# Patient Record
Sex: MALE | Race: WHITE | NOT HISPANIC OR LATINO | Employment: FULL TIME | URBAN - NONMETROPOLITAN AREA
[De-identification: names, ages, dates, MRNs, and addresses within clinical notes are randomized per-mention and may not be internally consistent; named-entity substitution may affect disease eponyms.]

---

## 2019-06-20 ENCOUNTER — OFFICE VISIT (OUTPATIENT)
Dept: URGENT CARE | Facility: CLINIC | Age: 34
End: 2019-06-20
Payer: OTHER MISCELLANEOUS

## 2019-06-20 DIAGNOSIS — Z23 NEED FOR TDAP VACCINATION: Primary | ICD-10-CM

## 2019-06-20 DIAGNOSIS — S81.811A LACERATION OF RIGHT LOWER EXTREMITY EXCLUDING THIGH, INITIAL ENCOUNTER: ICD-10-CM

## 2019-06-20 PROCEDURE — 99283 EMERGENCY DEPT VISIT LOW MDM: CPT | Performed by: PHYSICIAN ASSISTANT

## 2019-06-20 PROCEDURE — 12002 RPR S/N/AX/GEN/TRNK2.6-7.5CM: CPT | Performed by: PHYSICIAN ASSISTANT

## 2019-06-20 PROCEDURE — G0382 LEV 3 HOSP TYPE B ED VISIT: HCPCS | Performed by: PHYSICIAN ASSISTANT

## 2019-06-20 RX ORDER — CEPHALEXIN 500 MG/1
500 CAPSULE ORAL EVERY 8 HOURS SCHEDULED
Qty: 15 CAPSULE | Refills: 0 | Status: SHIPPED | OUTPATIENT
Start: 2019-06-20 | End: 2019-06-20 | Stop reason: SDUPTHER

## 2019-06-20 RX ORDER — CEPHALEXIN 500 MG/1
500 CAPSULE ORAL EVERY 8 HOURS SCHEDULED
Qty: 15 CAPSULE | Refills: 0 | Status: SHIPPED | OUTPATIENT
Start: 2019-06-20 | End: 2019-06-25

## 2019-06-28 ENCOUNTER — APPOINTMENT (OUTPATIENT)
Dept: URGENT CARE | Facility: CLINIC | Age: 34
End: 2019-06-28
Payer: OTHER MISCELLANEOUS

## 2019-06-28 PROCEDURE — 99213 OFFICE O/P EST LOW 20 MIN: CPT | Performed by: PHYSICIAN ASSISTANT

## 2021-10-20 ENCOUNTER — HOSPITAL ENCOUNTER (OUTPATIENT)
Dept: ULTRASOUND IMAGING | Facility: HOSPITAL | Age: 36
Discharge: HOME | End: 2021-10-20
Payer: COMMERCIAL

## 2021-10-20 DIAGNOSIS — R22.1 LOCALIZED SWELLING, MASS AND LUMP, NECK: ICD-10-CM

## 2021-10-20 PROCEDURE — 76536 US EXAM OF HEAD AND NECK: CPT

## 2022-01-13 ENCOUNTER — HOSPITAL ENCOUNTER (OUTPATIENT)
Dept: ULTRASOUND IMAGING | Facility: HOSPITAL | Age: 37
Discharge: HOME | End: 2022-01-13
Payer: COMMERCIAL

## 2022-01-13 DIAGNOSIS — R10.2 PERINEAL PAIN IN MALE: ICD-10-CM

## 2022-01-13 PROCEDURE — 76870 US EXAM SCROTUM: CPT

## 2024-02-07 ENCOUNTER — HOSPITAL ENCOUNTER (OUTPATIENT)
Dept: ULTRASOUND IMAGING | Facility: HOSPITAL | Age: 39
Discharge: HOME | End: 2024-02-07
Payer: COMMERCIAL

## 2024-02-07 DIAGNOSIS — R59.1 GENERALIZED ENLARGED LYMPH NODES: ICD-10-CM

## 2024-02-07 PROCEDURE — 76536 US EXAM OF HEAD AND NECK: CPT

## 2025-03-04 ENCOUNTER — TELEPHONE (OUTPATIENT)
Age: 40
End: 2025-03-04

## 2025-03-04 NOTE — TELEPHONE ENCOUNTER
NP calling to schedule appt for urinary hesitancy. Pt requesting an appt today or this week in [place] or [place]. Advised no appts available within timeframe at either location. Pt understood and will call back if he decides to schedule appt.

## 2025-03-09 ENCOUNTER — HOSPITAL ENCOUNTER (EMERGENCY)
Facility: HOSPITAL | Age: 40
Discharge: HOME | End: 2025-03-09
Attending: EMERGENCY MEDICINE | Admitting: EMERGENCY MEDICINE
Payer: COMMERCIAL

## 2025-03-09 ENCOUNTER — APPOINTMENT (EMERGENCY)
Dept: CT IMAGING | Facility: HOSPITAL | Age: 40
End: 2025-03-09
Payer: COMMERCIAL

## 2025-03-09 VITALS
SYSTOLIC BLOOD PRESSURE: 141 MMHG | OXYGEN SATURATION: 99 % | HEART RATE: 98 BPM | RESPIRATION RATE: 18 BRPM | TEMPERATURE: 98.1 F | DIASTOLIC BLOOD PRESSURE: 85 MMHG

## 2025-03-09 DIAGNOSIS — N30.90 CYSTITIS: Primary | ICD-10-CM

## 2025-03-09 LAB
ALBUMIN SERPL BCG-MCNC: 4.8 G/DL (ref 3.5–5)
ALP SERPL-CCNC: 65 U/L (ref 34–104)
ALT SERPL W P-5'-P-CCNC: 37 U/L (ref 7–52)
ANION GAP SERPL CALCULATED.3IONS-SCNC: 6 MMOL/L (ref 4–13)
AST SERPL W P-5'-P-CCNC: 21 U/L (ref 13–39)
BASOPHILS # BLD AUTO: 0.06 THOUSANDS/ÂΜL (ref 0–0.1)
BASOPHILS NFR BLD AUTO: 1 % (ref 0–1)
BILIRUB SERPL-MCNC: 0.56 MG/DL (ref 0.2–1)
BILIRUB UR QL STRIP: NEGATIVE
BUN SERPL-MCNC: 11 MG/DL (ref 5–25)
CALCIUM SERPL-MCNC: 9.5 MG/DL (ref 8.4–10.2)
CHLORIDE SERPL-SCNC: 105 MMOL/L (ref 96–108)
CLARITY UR: CLEAR
CO2 SERPL-SCNC: 30 MMOL/L (ref 21–32)
COLOR UR: YELLOW
CREAT SERPL-MCNC: 0.86 MG/DL (ref 0.6–1.3)
EOSINOPHIL # BLD AUTO: 0.09 THOUSAND/ÂΜL (ref 0–0.61)
EOSINOPHIL NFR BLD AUTO: 1 % (ref 0–6)
ERYTHROCYTE [DISTWIDTH] IN BLOOD BY AUTOMATED COUNT: 12.7 % (ref 11.6–15.1)
GFR SERPL CREATININE-BSD FRML MDRD: 110 ML/MIN/1.73SQ M
GLUCOSE SERPL-MCNC: 86 MG/DL (ref 65–140)
GLUCOSE UR STRIP-MCNC: NEGATIVE MG/DL
HCT VFR BLD AUTO: 47.2 % (ref 36.5–49.3)
HGB BLD-MCNC: 15.5 G/DL (ref 12–17)
HGB UR QL STRIP.AUTO: NEGATIVE
IMM GRANULOCYTES # BLD AUTO: 0.02 THOUSAND/UL (ref 0–0.2)
IMM GRANULOCYTES NFR BLD AUTO: 0 % (ref 0–2)
KETONES UR STRIP-MCNC: NEGATIVE MG/DL
LEUKOCYTE ESTERASE UR QL STRIP: NEGATIVE
LYMPHOCYTES # BLD AUTO: 2.21 THOUSANDS/ÂΜL (ref 0.6–4.47)
LYMPHOCYTES NFR BLD AUTO: 28 % (ref 14–44)
MCH RBC QN AUTO: 28.5 PG (ref 26.8–34.3)
MCHC RBC AUTO-ENTMCNC: 32.8 G/DL (ref 31.4–37.4)
MCV RBC AUTO: 87 FL (ref 82–98)
MONOCYTES # BLD AUTO: 0.57 THOUSAND/ÂΜL (ref 0.17–1.22)
MONOCYTES NFR BLD AUTO: 7 % (ref 4–12)
NEUTROPHILS # BLD AUTO: 4.9 THOUSANDS/ÂΜL (ref 1.85–7.62)
NEUTS SEG NFR BLD AUTO: 63 % (ref 43–75)
NITRITE UR QL STRIP: NEGATIVE
NRBC BLD AUTO-RTO: 0 /100 WBCS
PH UR STRIP.AUTO: 6 [PH]
PLATELET # BLD AUTO: 286 THOUSANDS/UL (ref 149–390)
PMV BLD AUTO: 11 FL (ref 8.9–12.7)
POTASSIUM SERPL-SCNC: 3.8 MMOL/L (ref 3.5–5.3)
PROT SERPL-MCNC: 7.3 G/DL (ref 6.4–8.4)
PROT UR STRIP-MCNC: NEGATIVE MG/DL
RBC # BLD AUTO: 5.43 MILLION/UL (ref 3.88–5.62)
SODIUM SERPL-SCNC: 141 MMOL/L (ref 135–147)
SP GR UR STRIP.AUTO: 1.02 (ref 1–1.03)
UROBILINOGEN UR QL STRIP.AUTO: 0.2 E.U./DL
WBC # BLD AUTO: 7.85 THOUSAND/UL (ref 4.31–10.16)

## 2025-03-09 PROCEDURE — 85025 COMPLETE CBC W/AUTO DIFF WBC: CPT | Performed by: EMERGENCY MEDICINE

## 2025-03-09 PROCEDURE — 74176 CT ABD & PELVIS W/O CONTRAST: CPT

## 2025-03-09 PROCEDURE — 87491 CHLMYD TRACH DNA AMP PROBE: CPT | Performed by: EMERGENCY MEDICINE

## 2025-03-09 PROCEDURE — 36415 COLL VENOUS BLD VENIPUNCTURE: CPT | Performed by: EMERGENCY MEDICINE

## 2025-03-09 PROCEDURE — 99284 EMERGENCY DEPT VISIT MOD MDM: CPT

## 2025-03-09 PROCEDURE — 87591 N.GONORRHOEAE DNA AMP PROB: CPT | Performed by: EMERGENCY MEDICINE

## 2025-03-09 PROCEDURE — 80053 COMPREHEN METABOLIC PANEL: CPT | Performed by: EMERGENCY MEDICINE

## 2025-03-09 PROCEDURE — 99284 EMERGENCY DEPT VISIT MOD MDM: CPT | Performed by: EMERGENCY MEDICINE

## 2025-03-09 PROCEDURE — 87086 URINE CULTURE/COLONY COUNT: CPT | Performed by: EMERGENCY MEDICINE

## 2025-03-09 PROCEDURE — 81003 URINALYSIS AUTO W/O SCOPE: CPT | Performed by: EMERGENCY MEDICINE

## 2025-03-09 RX ORDER — CEPHALEXIN 500 MG/1
500 CAPSULE ORAL EVERY 6 HOURS SCHEDULED
Qty: 40 CAPSULE | Refills: 0 | Status: SHIPPED | OUTPATIENT
Start: 2025-03-09 | End: 2025-03-19

## 2025-03-09 RX ADMIN — CEPHALEXIN 500 MG: 250 CAPSULE ORAL at 20:26

## 2025-03-09 NOTE — ED PROVIDER NOTES
Time reflects when diagnosis was documented in both MDM as applicable and the Disposition within this note       Time User Action Codes Description Comment    3/9/2025  8:13 PM Pacheco Lemus Add [N30.90] Cystitis           ED Disposition       ED Disposition   Discharge    Condition   Stable    Date/Time   Sun Mar 9, 2025  8:13 PM    Comment   Luciano LU Daubert discharge to home/self care.                   Assessment & Plan       Medical Decision Making  Amount and/or Complexity of Data Reviewed  Labs: ordered. Decision-making details documented in ED Course.  Radiology: ordered. Decision-making details documented in ED Course.    Risk  Prescription drug management.        ED Course as of 03/09/25 2022   Sun Mar 09, 2025   2022 Urinalysis unremarkable.  However CAT scan shows evidence of cystitis.  Patient is symptomatic.  Will treat with a short course of antibiotics.  Patient is agreeable to plan of care.       Medications   cephalexin (KEFLEX) capsule 500 mg (has no administration in time range)       ED Risk Strat Scores                            SBIRT 20yo+      Flowsheet Row Most Recent Value   Initial Alcohol Screen: US AUDIT-C     1. How often do you have a drink containing alcohol? 0 Filed at: 03/09/2025 1840   2. How many drinks containing alcohol do you have on a typical day you are drinking?  0 Filed at: 03/09/2025 1840   3a. Male UNDER 65: How often do you have five or more drinks on one occasion? 0 Filed at: 03/09/2025 1840   Audit-C Score 0 Filed at: 03/09/2025 1840   SB: How many times in the past year have you...    Used an illegal drug or used a prescription medication for non-medical reasons? Never Filed at: 03/09/2025 1840                            History of Present Illness       Chief Complaint   Patient presents with    Abdominal Pain     Pt reporting RLQ pain and scrotal pain with urinary urgency.        History reviewed. No pertinent past medical history.   Past Surgical History:    Procedure Laterality Date    DENTAL SURGERY        History reviewed. No pertinent family history.   Social History     Tobacco Use    Smoking status: Never    Smokeless tobacco: Never   Substance Use Topics    Alcohol use: Never    Drug use: Never      E-Cigarette/Vaping      E-Cigarette/Vaping Substances      I have reviewed and agree with the history as documented.     Patient complains of urinary urgency and frequency since the holiday time.  Was seen by urology and nothing done.  Continues with same symptoms.  Eating and drinking well.  Complaining of right-sided scrotal and right sided abdominal pain.  No nausea vomiting or diarrhea.  No fevers or chills.      History provided by:  Patient   used: No    Abdominal Pain  Pain location:  RLQ  Pain quality: not aching    Pain radiates to:  R flank  Pain severity:  Mild  Onset quality:  Gradual  Duration: Since [place] time.  Timing:  Constant  Progression:  Unchanged  Chronicity:  New  Context: not awakening from sleep, not diet changes, not laxative use, not recent travel, not sick contacts and not trauma    Relieved by:  Nothing  Worsened by:  Nothing  Ineffective treatments:  None tried  Associated symptoms: dysuria    Associated symptoms: no anorexia, no chest pain, no chills, no constipation, no cough, no diarrhea, no fatigue, no fever, no hematuria, no nausea, no shortness of breath, no sore throat and no vomiting        Review of Systems   Constitutional:  Negative for chills, fatigue and fever.   HENT:  Negative for ear pain, hearing loss, sore throat, trouble swallowing and voice change.    Eyes:  Negative for pain and discharge.   Respiratory:  Negative for cough, shortness of breath and wheezing.    Cardiovascular:  Negative for chest pain and palpitations.   Gastrointestinal:  Positive for abdominal pain. Negative for anorexia, blood in stool, constipation, diarrhea, nausea and vomiting.   Genitourinary:  Positive for dysuria and  urgency. Negative for flank pain, frequency, hematuria and penile discharge.   Musculoskeletal:  Negative for joint swelling, neck pain and neck stiffness.   Skin:  Negative for rash and wound.   Neurological:  Negative for dizziness, seizures, syncope, facial asymmetry and headaches.   Psychiatric/Behavioral:  Negative for hallucinations, self-injury and suicidal ideas.    All other systems reviewed and are negative.          Objective       ED Triage Vitals [03/09/25 1838]   Temperature Pulse Blood Pressure Respirations SpO2 Patient Position - Orthostatic VS   98.1 °F (36.7 °C) 98 165/79 18 99 % Sitting      Temp Source Heart Rate Source BP Location FiO2 (%) Pain Score    Temporal Monitor Right arm -- --      Vitals      Date and Time Temp Pulse SpO2 Resp BP Pain Score FACES Pain Rating User   03/09/25 2000 -- 98 99 % 18 141/85 -- -- CG   03/09/25 1930 -- 87 99 % 18 150/86 -- -- CG   03/09/25 1900 -- 94 99 % 18 150/78 -- --    03/09/25 1838 98.1 °F (36.7 °C) 98 99 % 18 165/79 -- -- MB            Physical Exam  Vitals and nursing note reviewed.   Constitutional:       General: He is not in acute distress.     Appearance: He is well-developed.   HENT:      Head: Normocephalic and atraumatic.      Right Ear: External ear normal.      Left Ear: External ear normal.   Eyes:      General: No scleral icterus.        Right eye: No discharge.         Left eye: No discharge.      Extraocular Movements: Extraocular movements intact.      Conjunctiva/sclera: Conjunctivae normal.   Cardiovascular:      Rate and Rhythm: Normal rate and regular rhythm.      Heart sounds: Normal heart sounds. No murmur heard.  Pulmonary:      Effort: Pulmonary effort is normal.      Breath sounds: Normal breath sounds. No wheezing or rales.   Abdominal:      General: Bowel sounds are normal. There is no distension.      Palpations: Abdomen is soft.      Tenderness: There is no abdominal tenderness. There is no guarding or rebound.    Genitourinary:     Penis: Normal.       Testes:         Right: Mass, tenderness or swelling not present.         Left: Mass, tenderness or swelling not present.   Musculoskeletal:         General: No deformity. Normal range of motion.      Cervical back: Normal range of motion and neck supple.   Skin:     General: Skin is warm and dry.      Findings: No rash.   Neurological:      General: No focal deficit present.      Mental Status: He is alert and oriented to person, place, and time.      Cranial Nerves: No cranial nerve deficit.   Psychiatric:         Mood and Affect: Mood normal.         Behavior: Behavior normal.         Thought Content: Thought content normal.         Judgment: Judgment normal.         Results Reviewed       Procedure Component Value Units Date/Time    Comprehensive metabolic panel [[id number]] Collected: 03/09/25 1851    Lab Status: Final result Specimen: Blood from Arm, Left Updated: 03/09/25 1912     Sodium 141 mmol/L      Potassium 3.8 mmol/L      Chloride 105 mmol/L      CO2 30 mmol/L      ANION GAP 6 mmol/L      BUN 11 mg/dL      Creatinine 0.86 mg/dL      Glucose 86 mg/dL      Calcium 9.5 mg/dL      AST 21 U/L      ALT 37 U/L      Alkaline Phosphatase 65 U/L      Total Protein 7.3 g/dL      Albumin 4.8 g/dL      Total Bilirubin 0.56 mg/dL      eGFR 110 ml/min/1.73sq m     Narrative:      National Kidney Disease Foundation guidelines for Chronic Kidney Disease (CKD):     Stage 1 with normal or high GFR (GFR > 90 mL/min/1.73 square meters)    Stage 2 Mild CKD (GFR = 60-89 mL/min/1.73 square meters)    Stage 3A Moderate CKD (GFR = 45-59 mL/min/1.73 square meters)    Stage 3B Moderate CKD (GFR = 30-44 mL/min/1.73 square meters)    Stage 4 Severe CKD (GFR = 15-29 mL/min/1.73 square meters)    Stage 5 End Stage CKD (GFR <15 mL/min/1.73 square meters)  Note: GFR calculation is accurate only with a steady state creatinine    UA w Reflex to Microscopic w Reflex to Culture [[id number]] Collected:  03/09/25 1900    Lab Status: Final result Specimen: Urine, Other Updated: 03/09/25 1909     Color, UA Yellow     Clarity, UA Clear     Specific Gravity, UA 1.020     pH, UA 6.0     Leukocytes, UA Negative     Nitrite, UA Negative     Protein, UA Negative mg/dl      Glucose, UA Negative mg/dl      Ketones, UA Negative mg/dl      Urobilinogen, UA 0.2 E.U./dl      Bilirubin, UA Negative     Occult Blood, UA Negative    Urine culture [[id number]] Collected: 03/09/25 1900    Lab Status: In process Specimen: Urine, Clean Catch Updated: 03/09/25 1905    Chlamydia/GC amplified DNA by PCR [[id number]] Collected: 03/09/25 1900    Lab Status: In process Specimen: Urine, Other Updated: 03/09/25 1905    CBC and differential [[id number]] Collected: 03/09/25 1851    Lab Status: Final result Specimen: Blood from Arm, Left Updated: 03/09/25 1857     WBC 7.85 Thousand/uL      RBC 5.43 Million/uL      Hemoglobin 15.5 g/dL      Hematocrit 47.2 %      MCV 87 fL      MCH 28.5 pg      MCHC 32.8 g/dL      RDW 12.7 %      MPV 11.0 fL      Platelets 286 Thousands/uL      nRBC 0 /100 WBCs      Segmented % 63 %      Immature Grans % 0 %      Lymphocytes % 28 %      Monocytes % 7 %      Eosinophils Relative 1 %      Basophils Relative 1 %      Absolute Neutrophils 4.90 Thousands/µL      Absolute Immature Grans 0.02 Thousand/uL      Absolute Lymphocytes 2.21 Thousands/µL      Absolute Monocytes 0.57 Thousand/µL      Eosinophils Absolute 0.09 Thousand/µL      Basophils Absolute 0.06 Thousands/µL             CT abdomen pelvis wo contrast   Final Interpretation by Bradley Landon Kocher, MD (03/09 2000)      1.  No obstructive uropathy. Punctate nonobstructing right renal calculi.   2.  Mild circumferential bladder wall thickening may represent cystitis versus underdistention. Correlate with urinalysis to evaluate for cystitis.   3.  3 mm pulmonary nodules. Based on current Fleischner Society 2017 Guidelines on incidental pulmonary nodule, no routine  follow-up is needed if the patient is low risk. If the patient is high risk, optional follow-up chest CT at 12 months can be    considered.      Workstation performed: [id number]             Procedures    ED Medication and Procedure Management   None     Patient's Medications   Discharge Prescriptions    CEPHALEXIN (KEFLEX) 500 MG CAPSULE    Take 1 capsule (500 mg total) by mouth every 6 (six) hours for 10 days       Start Date: 3/9/2025  End Date: 3/19/2025       Order Dose: 500 mg       Quantity: 40 capsule    Refills: 0     No discharge procedures on file.  ED SEPSIS DOCUMENTATION   Time reflects when diagnosis was documented in both MDM as applicable and the Disposition within this note       Time User Action Codes Description Comment    3/9/2025  8:13 PM Pacheco Lemus Add [N30.90] Cystitis                  Pacheco eLmus MD  03/09/25 2022

## 2025-03-10 LAB
C TRACH DNA SPEC QL NAA+PROBE: NEGATIVE
N GONORRHOEA DNA SPEC QL NAA+PROBE: NEGATIVE

## 2025-03-10 NOTE — DISCHARGE INSTRUCTIONS
"Patient Education     Urinary tract infection - Discharge instructions   The Basics   Written by the doctors and editors at [facility]   What are discharge instructions? -- Discharge instructions are information about how to take care of yourself after getting medical care for a health problem.  What is a urinary tract infection? -- A urinary tract infection (\"UTI\") is an infection that affects either the bladder or the kidneys (figure 1). A kidney infection is more serious, and can lead to other serious problems if it is not treated properly.  You need to take antibiotics to treat a UTI. It is important to take all of your antibiotics, even if you start to feel better.  How do I care for myself at home? -- Ask the doctor or nurse what you should do when you go home. Make sure that you understand exactly what you need to do to care for yourself. Ask questions if there is anything you do not understand.  You should also:   Take all of your medicines as instructed.   Take phenazopyridine (sample brand name: AZO Urinary Pain Relief) for the first day or so, if you choose. This is an over-the-counter medicine. It will help numb your bladder and decrease the urge to urinate. This medicine causes your urine and tears to look orange.   Take acetaminophen (sample brand name: Tylenol) if needed for pain.   Drink extra fluids. This can help prevent more bladder infections. If you have sex, these things might also help:   Urinate right afterward.   If you use birth control, use a form that does not contain spermicide.  When should I call the doctor? -- Call for advice if:   You have pain in your back, shoulder, or belly.   You have a fever, shaking chills, or sweats even though you are taking antibiotics.   You notice more blood in your urine.   Your symptoms get worse or do not get better within 24 hours of starting antibiotics.   Your symptoms come back after finishing treatment.   You have any new or worrying symptoms.  All " topics are updated as new evidence becomes available and our peer review process is complete.  This topic retrieved from [employer] on: Feb 26, 2024.  Topic 283106 Version 1.0  Release: 32.2.4 - C32.56  © 2024 UpToDate, Inc. and/or its affiliates. All rights reserved.  figure 1: Anatomy of the urinary tract     Urine is made by the kidneys. It passes from the kidneys into the bladder through 2 tubes called the ureters. Then, it leaves the bladder through another tube called the urethra.  Graphic 21105 Version 8.0  Consumer Information Use and Disclaimer   Disclaimer: This generalized information is a limited summary of diagnosis, treatment, and/or medication information. It is not meant to be comprehensive and should be used as a tool to help the user understand and/or assess potential diagnostic and treatment options. It does NOT include all information about conditions, treatments, medications, side effects, or risks that may apply to a specific patient. It is not intended to be medical advice or a substitute for the medical advice, diagnosis, or treatment of a health care provider based on the health care provider's examination and assessment of a patient's specific and unique circumstances. Patients must speak with a health care provider for complete information about their health, medical questions, and treatment options, including any risks or benefits regarding use of medications. This information does not endorse any treatments or medications as safe, effective, or approved for treating a specific patient. UpToDate, Inc. and its affiliates disclaim any warranty or liability relating to this information or the use thereof.The use of this information is governed by the Terms of Use, available at https://www.wolterskluwer.com/en/know/clinical-effectiveness-terms. 2024© UpToDate, Inc. and its affiliates and/or licensors. All rights reserved.  Copyright   © 2024 UpToDate, Inc. and/or its affiliates. All rights  reserved.

## 2025-03-11 LAB — BACTERIA UR CULT: NORMAL

## 2025-06-17 ENCOUNTER — HOSPITAL ENCOUNTER (EMERGENCY)
Facility: HOSPITAL | Age: 40
Discharge: HOME | End: 2025-06-17
Attending: EMERGENCY MEDICINE
Payer: COMMERCIAL

## 2025-06-17 VITALS
RESPIRATION RATE: 16 BRPM | HEART RATE: 89 BPM | DIASTOLIC BLOOD PRESSURE: 70 MMHG | SYSTOLIC BLOOD PRESSURE: 122 MMHG | OXYGEN SATURATION: 97 % | HEIGHT: 69 IN | BODY MASS INDEX: 35.66 KG/M2 | WEIGHT: 240.74 LBS

## 2025-06-17 DIAGNOSIS — M79.604 PAIN OF RIGHT LOWER EXTREMITY: Primary | ICD-10-CM

## 2025-06-17 PROCEDURE — 99284 EMERGENCY DEPT VISIT MOD MDM: CPT | Performed by: EMERGENCY MEDICINE

## 2025-06-17 PROCEDURE — 99282 EMERGENCY DEPT VISIT SF MDM: CPT

## 2025-06-17 RX ORDER — ACETAMINOPHEN 325 MG/1
650 TABLET ORAL EVERY 6 HOURS PRN
Qty: 30 TABLET | Refills: 0 | Status: SHIPPED | OUTPATIENT
Start: 2025-06-17 | End: 2025-06-19 | Stop reason: ALTCHOICE

## 2025-06-17 RX ORDER — IBUPROFEN 800 MG/1
800 TABLET, FILM COATED ORAL 3 TIMES DAILY
Qty: 21 TABLET | Refills: 0 | Status: SHIPPED | OUTPATIENT
Start: 2025-06-17

## 2025-06-17 RX ORDER — ACETAMINOPHEN 325 MG/1
650 TABLET ORAL ONCE
Status: COMPLETED | OUTPATIENT
Start: 2025-06-17 | End: 2025-06-17

## 2025-06-17 RX ORDER — IBUPROFEN 400 MG/1
800 TABLET, FILM COATED ORAL ONCE
Status: COMPLETED | OUTPATIENT
Start: 2025-06-17 | End: 2025-06-17

## 2025-06-17 RX ADMIN — ACETAMINOPHEN 650 MG: 325 TABLET ORAL at 21:40

## 2025-06-17 RX ADMIN — IBUPROFEN 800 MG: 400 TABLET, FILM COATED ORAL at 21:40

## 2025-06-17 NOTE — Clinical Note
Luciano Wick was seen and treated in our emergency department on 6/17/2025.    No restrictions            Diagnosis:     Luciano  may return to work on return date.    He may return on this date: 06/23/2025         If you have any questions or concerns, please don't hesitate to call.      Rajesh Gonzáles, DO    ______________________________           _______________          _______________  Hospital Representative                              Date                                Time

## 2025-06-18 RX ORDER — HYDROCORTISONE 25 MG/G
OINTMENT TOPICAL
COMMUNITY
Start: 2025-05-16

## 2025-06-18 RX ORDER — BUSPIRONE HYDROCHLORIDE 10 MG/1
10 TABLET ORAL 2 TIMES DAILY
COMMUNITY
Start: 2025-06-03

## 2025-06-18 NOTE — ED PROVIDER NOTES
Time reflects when diagnosis was documented in both MDM as applicable and the Disposition within this note       Time User Action Codes Description Comment    6/17/2025  9:37 PM Rajesh Gonzáles Add [M79.604] Pain of right lower extremity           ED Disposition       ED Disposition   Discharge    Condition   Stable    Date/Time   Tue Jun 17, 2025  9:37 PM    Comment   Luciano LU Daubert discharge to home/self care.                   Assessment & Plan       Medical Decision Making  31-year-old male presents to the emergency department with complaint of calf tenderness, differential diagnoses include musculoskeletal strain, sprain, doubt fracture, no trauma to the area.  Will start patient on conservative treatment with Motrin, Tylenol, and have him follow-up with orthopedic surgery for further evaluation, and potentially advanced imaging including MRI to evaluate for musculoskeletal tear, ligamentous, or tendinous injury.  Patient also given physical therapy and Occupational Therapy.  Strict turn precautions given.  Patient understands and agrees with treatment plan.    Risk  OTC drugs.  Prescription drug management.             Medications   acetaminophen (TYLENOL) tablet 650 mg (650 mg Oral Given 6/17/25 2140)   ibuprofen (MOTRIN) tablet 800 mg (800 mg Oral Given 6/17/25 2140)       ED Risk Strat Scores                    No data recorded        SBIRT 20yo+      Flowsheet Row Most Recent Value   Initial Alcohol Screen: US AUDIT-C     1. How often do you have a drink containing alcohol? 0 Filed at: 06/17/2025 2049   2. How many drinks containing alcohol do you have on a typical day you are drinking?  0 Filed at: 06/17/2025 2049   3a. Male UNDER 65: How often do you have five or more drinks on one occasion? 0 Filed at: 06/17/2025 2049   3b. FEMALE Any Age, or MALE 65+: How often do you have 4 or more drinks on one occassion? 0 Filed at: 06/17/2025 2049   Audit-C Score 0 Filed at: 06/17/2025 2049   SB: How many times in  the past year have you...    Used an illegal drug or used a prescription medication for non-medical reasons? Never Filed at: 06/17/2025 2049                            History of Present Illness       Chief Complaint   Patient presents with    Leg Injury     Right lower leg pain, began this evening while patient was running at baseball practice twisted his foot the wrong way and felt a pain in his right lower calf and it has been painful since.        Past Medical History[1]   Past Surgical History[2]   Family History[3]   Social History[4]   E-Cigarette/Vaping    E-Cigarette Use Never User       E-Cigarette/Vaping Substances      I have reviewed and agree with the history as documented.     31-year-old male presents to the emergency department with complaint of right calf pain.  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  Patient denies any bruising, swelling, patient had some tenderness into the calf region.  Patient is ambulating in the emergency department without difficulty or assistance, is neurovascular intact throughout, Achilles tendon intact, negative Carney's test, patient denies other injuries.          Review of Systems   Constitutional:  Negative for chills and fever.   HENT:  Negative for ear pain and sore throat.    Eyes:  Negative for pain and visual disturbance.   Respiratory:  Negative for cough and shortness of breath.    Cardiovascular:  Negative for chest pain and palpitations.   Gastrointestinal:  Negative for abdominal pain and vomiting.   Genitourinary:  Negative for dysuria and hematuria.   Musculoskeletal:  Positive for myalgias. Negative for arthralgias and back pain.   Skin:  Negative for color change and rash.   Neurological:  Negative for seizures and syncope.   All other systems reviewed and are negative.          Objective        ED Triage Vitals [06/17/25 2050]   Temp Pulse Blood Pressure Respirations SpO2 Patient Position - Orthostatic VS   -- 93 143/75 18 98 % Sitting      Temp src Heart Rate Source BP Location FiO2 (%) Pain Score    -- Monitor Left arm -- 5      Vitals      Date and Time Temp Pulse SpO2 Resp BP Pain Score FACES Pain Rating User   06/17/25 2140 -- -- -- -- -- 5 --    06/17/25 2100 -- 89 97 % 16 122/70 -- --    06/17/25 2050 -- 93 98 % 18 143/75 5 -- CG            Physical Exam  Vitals and nursing note reviewed.   Constitutional:       General: He is not in acute distress.     Appearance: He is well-developed.   HENT:      Head: Normocephalic and atraumatic.     Eyes:      Conjunctiva/sclera: Conjunctivae normal.       Cardiovascular:      Rate and Rhythm: Normal rate and regular rhythm.   Pulmonary:      Effort: Pulmonary effort is normal. No respiratory distress.      Breath sounds: Normal breath sounds.   Abdominal:      Palpations: Abdomen is soft.      Tenderness: There is no abdominal tenderness.     Musculoskeletal:         General: Tenderness present. No swelling.      Cervical back: Neck supple.     Skin:     General: Skin is warm and dry.      Capillary Refill: Capillary refill takes less than 2 seconds.     Neurological:      Mental Status: He is alert.     Psychiatric:         Mood and Affect: Mood normal.         Results Reviewed       None            No orders to display       Procedures    ED Medication and Procedure Management   None     Patient's Medications   Discharge Prescriptions    ACETAMINOPHEN (TYLENOL) 325 MG TABLET    Take 2 tablets (650 mg total) by mouth every 6 (six) hours as needed for mild pain       Start Date: 6/17/2025 End Date: --       Order Dose: 650 mg       Quantity: 30 tablet    Refills: 0    IBUPROFEN (MOTRIN) 800 MG TABLET    Take 1 tablet (800 mg total) by mouth 3 (three) times a day       Start Date: 6/17/2025 End Date: --       Order Dose: 800 mg       Quantity: 21  tablet    Refills: 0       ED SEPSIS DOCUMENTATION   Time reflects when diagnosis was documented in both MDM as applicable and the Disposition within this note       Time User Action Codes Description Comment    6/17/2025  9:37 PM Rajesh Gonzáles Add [M79.604] Pain of right lower extremity                    [1] No past medical history on file.  [2]   Past Surgical History:  Procedure Laterality Date    DENTAL SURGERY     [3] No family history on file.  [4]   Social History  Tobacco Use    Smoking status: Never    Smokeless tobacco: Never   Vaping Use    Vaping status: Never Used   Substance Use Topics    Alcohol use: Never    Drug use: Never        Rajesh Gonzáles,   06/17/25 2143

## 2025-06-18 NOTE — DISCHARGE INSTRUCTIONS
You were seen in the emergency department for calf pain, your presentation is likely due to a musculoskeletal strain, or a sprain.  We have provided you with follow-up to orthopedic surgery's office, and consults to physical therapy, and Occupational Therapy, please call their offices to schedule an appointment.  Please use ice, and anti-inflammatory medications as needed for the pain.  If your symptoms worsen or persist, please return to the emergency department immediately.   Discharged

## 2025-06-19 VITALS — WEIGHT: 236.6 LBS | HEIGHT: 69 IN | BODY MASS INDEX: 35.04 KG/M2

## 2025-06-19 DIAGNOSIS — S86.811A STRAIN OF CALF MUSCLE, RIGHT, INITIAL ENCOUNTER: ICD-10-CM

## 2025-06-19 PROBLEM — S86.819A STRAIN OF CALF MUSCLE: Status: ACTIVE | Noted: 2025-06-19

## 2025-06-19 PROCEDURE — 99203 OFFICE O/P NEW LOW 30 MIN: CPT | Performed by: STUDENT IN AN ORGANIZED HEALTH CARE EDUCATION/TRAINING PROGRAM

## 2025-06-19 NOTE — ASSESSMENT & PLAN NOTE
39 year old male presents for right calf pain starting 2 days ago. Discussed with patient that physical exam findings are benign, with no evidence of achilles tendon injury. Discussed this is most likely grade I or II calf strain. Advised patient to continue NSAIDS use and ice as needed for pain, with slow progression to normal activity using pain as a guide. Educated patient this should resolve in 1-2 weeks. He is to follow up as needed for worsening pain and swelling.   Orders:    Ambulatory Referral to Orthopedic Surgery

## 2025-06-19 NOTE — PROGRESS NOTES
"Name: Luciano Wick      : 1985      MRN: [id number]  Encounter Provider: Jordan De La Vega MD  Encounter Date: 2025   Encounter department: [facility] [place]  :  Assessment & Plan  Strain of calf muscle, right, initial encounter  39 year old male presents for right calf pain starting 2 days ago. Discussed with patient that physical exam findings are benign, with no evidence of achilles tendon injury. Discussed this is most likely grade I or II calf strain. Advised patient to continue NSAIDS use and ice as needed for pain, with slow progression to normal activity using pain as a guide. Educated patient this should resolve in 1-2 weeks. He is to follow up as needed for worsening pain and swelling.   Orders:    Ambulatory Referral to Orthopedic Surgery        History of Present Illness   HPI  Luciano Wick is a 39 y.o. male who presents with right calf pain starting 2 days ago after a pivoting injury. Fekrt a pop and pain. Went to the ED and was referred to PT and orthopedic surgery. States he is able to ambulate with no pain. Reports pain in upper calf with with certain movements. Has been taking ibuprofen, icing and elevating which is helping. Reports some swelling yesterday that has resolved. Has been using crutches for ambulatory assistance but denies pain with ambulation. Denies past injury or surgery to right lower extremity.   History obtained from: patient    Review of Systems  Medical History Reviewed by provider this encounter:  Tobacco  Allergies  Meds  Problems  Med Hx  Surg Hx  Fam Hx     .     Objective   Ht 5' 9\" (1.753 m)   Wt 107 kg (236 lb 9.6 oz)   BMI 34.94 kg/m²      Physical Exam  Right lower extremity without ecchymosis, erythema. Mild edema noted. Area of chronic ecchymosis on right posterior lower extremity. Mild tenderness to palpation over posterior aspect of right lower extremity. Negative Carney's test. Patient has full range of " motion in right knee and right ankle without pain. Sensation in right lower extremity full and equal to left lower extremity.       No imaging for this visit.      Return if symptoms worsen or fail to improve.      Scribe Attestation      I,:  Mehul Young PA-C am acting as a scribe while in the presence of the attending physician.:       I,:  Jordan De La Vega MD personally performed the services described in this documentation    as scribed in my presence.:

## 2025-07-09 ENCOUNTER — EVALUATION (OUTPATIENT)
Dept: PHYSICAL THERAPY | Facility: CLINIC | Age: 40
End: 2025-07-09
Attending: EMERGENCY MEDICINE
Payer: COMMERCIAL

## 2025-07-09 DIAGNOSIS — M79.661 PAIN OF RIGHT CALF: Primary | ICD-10-CM

## 2025-07-09 DIAGNOSIS — M79.604 PAIN OF RIGHT LOWER EXTREMITY: ICD-10-CM

## 2025-07-09 PROCEDURE — 97535 SELF CARE MNGMENT TRAINING: CPT

## 2025-07-09 PROCEDURE — 97110 THERAPEUTIC EXERCISES: CPT

## 2025-07-09 PROCEDURE — 97161 PT EVAL LOW COMPLEX 20 MIN: CPT

## 2025-07-09 NOTE — LETTER
2025    Armando Holder DO  [address] [facility] [address]    Patient: Luciano Wick   YOB: 1985   Date of Visit: 2025     Encounter Diagnosis     ICD-10-CM    1. Pain of right calf  M79.661       2. Pain of right lower extremity  M79.604 Ambulatory Referral to Physical Therapy          Dear Dr. Armando Holder, DO:    Thank you for your recent referral of Luciano Wick. Please review the attached evaluation summary from Luciano's recent visit.     Please verify that you agree with the plan of care by signing the attached order.     If you have any questions or concerns, please do not hesitate to call.     I sincerely appreciate the opportunity to share in the care of one of your patients and hope to have another opportunity to work with you in the near future.       Sincerely,    Chinedu Sood, PT      Referring Provider:      I certify that I have read the below Plan of Care and certify the need for these services furnished under this plan of treatment while under my care.                    Armando Holder DO  [address] [facility] [address]  Via Fax: [phone]          PT Evaluation     Today's date: 2025  Patient name: Luciano Wick  : 1985  MRN: [id number]  Referring provider: Armando Holder Kenny*  Dx:   Encounter Diagnosis     ICD-10-CM    1. Pain of right calf  M79.661       2. Pain of right lower extremity  M79.604 Ambulatory Referral to Physical Therapy          Start Time: 815  Stop Time: 900  Total time in clinic (min): 45 minutes    Assessment  Impairments: abnormal or restricted ROM, activity intolerance, impaired physical strength, lacks appropriate home exercise program and pain with function  Symptom irritability: low    Assessment details: Patient is a 40 y/o male who presents to physical therapy with R calf pain.  AMOL consists of pivoting on his R ankle when running during his son's  baseball practice.  Patient deficits are minor at this point including decreased soft tissue extensibility of calf, decreased inversion strength, and apprehension to return to running.  Options of treatment were discussed and patient is in agreement with HEP to improve strength and calf mobility.  PT feels patient is appropriate for IND HEP which was gone over during IE and all questions were addressed.  Patient was instructed to call if issues persist or return or if any issues arise with HEP.  Will begin regular PT sessions if needed.        Goals  STG 1 Patient will report 50% improvement in overall condition in 3 weeks to display improved quality of life.    STG 2 Patient will display DF ROM to 10 degrees or greater to display normalized gait in 3 weeks.      LTG 1 Patient will report no pain in ankle to display improved ability to perform sport and recreational activities in 6 weeks.    LTG 2  Patient will display 5/5 R ankle strength to display improved ankle stability and reduce risk of re injury in 6 weeks.         Plan  Patient would benefit from: PT eval and skilled physical therapy  Referral necessary: No  Planned modality interventions: TENS, thermotherapy: hydrocollator packs and cryotherapy    Planned therapy interventions: IASTM, joint mobilization, kinesiology taping, manual therapy, massage, Correa taping, muscle pump exercises, neuromuscular re-education, patient education, strengthening, stretching, therapeutic activities, therapeutic exercise, home exercise program, ADL retraining, balance and balance/weight bearing training    Frequency: 1x week  Duration in weeks: 6  Plan of Care beginning date: 7/9/2025  Plan of Care expiration date: 8/20/2025  Treatment plan discussed with: patient      Subjective Evaluation    History of Present Illness  Date of onset: 6/17/2025  Mechanism of injury: trauma  Mechanism of injury: Patient reports he was helping his kids with all star practice on 6/17.  He  reports he was running and pivoted quickly on the RLE.  He reports he felt a pop in the calf.  He went to the ER as he suspected an achilles tear.  Patient reports he was then referred to an orthopedic surgeon.  Patient was cleared for any achilles injury and was diagnosed with a calf strain.  Patient reports at this point he is nearly back to normal.  He reports pain has fully resolved.  Patient reports he has not started running again or testing the limits but he has been walking and doing yardwork normally with no difficulty.  Patient reports initial swelling which has also fully resolved.  Patient reports he still has some hesitancy with movements.          Not a recurrent problem   Quality of life: excellent    Patient Goals  Patient goals for therapy: decreased pain, increased motion, increased strength, return to sport/leisure activities, return to work and independence with ADLs/IADLs    Pain  No pain reported  Current pain ratin  At best pain ratin  At worst pain ratin  Progression: resolved    Social Support    Employment status: working    Diagnostic Tests  No diagnostic tests performed  Treatments  Current treatment: physical therapy      Objective     General Comments:      Ankle/Foot Comments   Ankle ROM:  DF: 5  PF: 50  EV: 10  IV: 40    Ankle MMT:  DF: 5    PF: 5  EV: 5  IV: 4+      CKC R DF 45             Precautions: N/A    Daily Treatment Diary:      Initial Evaluation Date:   Compliance             Visit Number 1            Re-Eval              Foto Captured Y                               Manual                                                    Ther-Ex                                                                                                                                                                                      Neuro Re-Ed                                                                                                                     Ther-Act                                                                   Modalities

## 2025-07-09 NOTE — HOME EXERCISE EDUCATION
Program_ID:[id number]   Access Code: [email or web address]  URL: [email or web address]  Date: 07-  Prepared By: Chinedu Sood    Program Notes      Exercises      - Gastroc Stretch on Wall - 3 x daily - 7 x weekly - 1 sets - 3 reps - 30s hold      - Standing Single Leg Heel Raise - 3 x daily - 7 x weekly - 1 sets - 20 reps      - Long Sitting Ankle Inversion with Anchored Resistance - 3 x daily - 7 x weekly - 1 sets - 20 reps      - Standing Calf Raise With Small Ball at Heels - 3 x daily - 7 x weekly - 1 sets - 20 reps      - Heel Raise on Step - 3 x daily - 7 x weekly - 1 sets - 20 reps

## 2025-07-09 NOTE — PROGRESS NOTES
PT Evaluation     Today's date: 2025  Patient name: Luciano Wick  : 1985  MRN: [id number]  Referring provider: Armando Holder*  Dx:   Encounter Diagnosis     ICD-10-CM    1. Pain of right calf  M79.661       2. Pain of right lower extremity  M79.604 Ambulatory Referral to Physical Therapy          Start Time: 0815  Stop Time: 0900  Total time in clinic (min): 45 minutes    Assessment  Impairments: abnormal or restricted ROM, activity intolerance, impaired physical strength, lacks appropriate home exercise program and pain with function  Symptom irritability: low    Assessment details: Patient is a 38 y/o male who presents to physical therapy with R calf pain.  AMOL consists of pivoting on his R ankle when running during his son's baseball practice.  Patient deficits are minor at this point including decreased soft tissue extensibility of calf, decreased inversion strength, and apprehension to return to running.  Options of treatment were discussed and patient is in agreement with HEP to improve strength and calf mobility.  PT feels patient is appropriate for IND HEP which was gone over during IE and all questions were addressed.  Patient was instructed to call if issues persist or return or if any issues arise with HEP.  Will begin regular PT sessions if needed.        Goals  STG 1 Patient will report 50% improvement in overall condition in 3 weeks to display improved quality of life.    STG 2 Patient will display DF ROM to 10 degrees or greater to display normalized gait in 3 weeks.      LTG 1 Patient will report no pain in ankle to display improved ability to perform sport and recreational activities in 6 weeks.    LTG 2  Patient will display 5/5 R ankle strength to display improved ankle stability and reduce risk of re injury in 6 weeks.         Plan  Patient would benefit from: PT eval and skilled physical therapy  Referral necessary: No  Planned modality interventions: TENS,  thermotherapy: hydrocollator packs and cryotherapy    Planned therapy interventions: IASTM, joint mobilization, kinesiology taping, manual therapy, massage, Correa taping, muscle pump exercises, neuromuscular re-education, patient education, strengthening, stretching, therapeutic activities, therapeutic exercise, home exercise program, ADL retraining, balance and balance/weight bearing training    Frequency: 1x week  Duration in weeks: 6  Plan of Care beginning date: 2025  Plan of Care expiration date: 2025  Treatment plan discussed with: patient      Subjective Evaluation    History of Present Illness  Date of onset: 2025  Mechanism of injury: trauma  Mechanism of injury: Patient reports he was helping his kids with all star practice on .  He reports he was running and pivoted quickly on the RLE.  He reports he felt a pop in the calf.  He went to the ER as he suspected an achilles tear.  Patient reports he was then referred to an orthopedic surgeon.  Patient was cleared for any achilles injury and was diagnosed with a calf strain.  Patient reports at this point he is nearly back to normal.  He reports pain has fully resolved.  Patient reports he has not started running again or testing the limits but he has been walking and doing yardwork normally with no difficulty.  Patient reports initial swelling which has also fully resolved.  Patient reports he still has some hesitancy with movements.          Not a recurrent problem   Quality of life: excellent    Patient Goals  Patient goals for therapy: decreased pain, increased motion, increased strength, return to sport/leisure activities, return to work and independence with ADLs/IADLs    Pain  No pain reported  Current pain ratin  At best pain ratin  At worst pain ratin  Progression: resolved    Social Support    Employment status: working    Diagnostic Tests  No diagnostic tests performed  Treatments  Current treatment: physical  therapy      Objective     General Comments:      Ankle/Foot Comments   Ankle ROM:  DF: 5  PF: 50  EV: 10  IV: 40    Ankle MMT:  DF: 5    PF: 5  EV: 5  IV: 4+      CKC R DF 45             Precautions: N/A    Daily Treatment Diary:      Initial Evaluation Date:   Compliance 7/9            Visit Number 1            Re-Eval              Foto Captured Y                   7/9            Manual                                                    Ther-Ex                                                                                                                                                                                      Neuro Re-Ed                                                                                                                     Ther-Act                                                                  Modalities

## 2025-08-09 ENCOUNTER — HOSPITAL ENCOUNTER (EMERGENCY)
Facility: HOSPITAL | Age: 40
Discharge: HOME | End: 2025-08-09
Attending: EMERGENCY MEDICINE | Admitting: EMERGENCY MEDICINE
Payer: COMMERCIAL

## 2025-08-09 ENCOUNTER — HOSPITAL ENCOUNTER (EMERGENCY)
Facility: HOSPITAL | Age: 40
Discharge: HOME | End: 2025-08-09
Attending: EMERGENCY MEDICINE
Payer: COMMERCIAL

## 2025-08-09 VITALS
RESPIRATION RATE: 20 BRPM | SYSTOLIC BLOOD PRESSURE: 156 MMHG | TEMPERATURE: 97.6 F | OXYGEN SATURATION: 99 % | HEART RATE: 98 BPM | DIASTOLIC BLOOD PRESSURE: 109 MMHG

## 2025-08-09 DIAGNOSIS — J02.9 SORE THROAT: Primary | ICD-10-CM

## 2025-08-09 PROCEDURE — 99284 EMERGENCY DEPT VISIT MOD MDM: CPT

## 2025-08-09 PROCEDURE — 99282 EMERGENCY DEPT VISIT SF MDM: CPT

## 2025-08-19 ENCOUNTER — OFFICE VISIT (OUTPATIENT)
Dept: URGENT CARE | Facility: CLINIC | Age: 40
End: 2025-08-19
Payer: COMMERCIAL

## 2025-08-19 VITALS
OXYGEN SATURATION: 98 % | SYSTOLIC BLOOD PRESSURE: 142 MMHG | DIASTOLIC BLOOD PRESSURE: 86 MMHG | TEMPERATURE: 97.8 F | HEART RATE: 88 BPM | WEIGHT: 219 LBS | BODY MASS INDEX: 31.35 KG/M2 | HEIGHT: 70 IN | RESPIRATION RATE: 18 BRPM

## 2025-08-19 DIAGNOSIS — J39.2 THROAT IRRITATION: Primary | ICD-10-CM

## 2025-08-19 LAB — S PYO AG THROAT QL: NEGATIVE

## 2025-08-19 PROCEDURE — 87880 STREP A ASSAY W/OPTIC: CPT | Performed by: PHYSICIAN ASSISTANT

## 2025-08-19 PROCEDURE — S9088 SERVICES PROVIDED IN URGENT: HCPCS | Performed by: PHYSICIAN ASSISTANT

## 2025-08-19 PROCEDURE — 99213 OFFICE O/P EST LOW 20 MIN: CPT | Performed by: PHYSICIAN ASSISTANT

## 2025-08-19 PROCEDURE — 87070 CULTURE OTHR SPECIMN AEROBIC: CPT | Performed by: PHYSICIAN ASSISTANT

## 2025-08-21 LAB — BACTERIA THROAT CULT: NORMAL
